# Patient Record
Sex: MALE | Race: WHITE | Employment: FULL TIME | URBAN - METROPOLITAN AREA
[De-identification: names, ages, dates, MRNs, and addresses within clinical notes are randomized per-mention and may not be internally consistent; named-entity substitution may affect disease eponyms.]

---

## 2018-04-06 ENCOUNTER — OFFICE VISIT - RIVER FALLS (OUTPATIENT)
Dept: FAMILY MEDICINE | Facility: CLINIC | Age: 33
End: 2018-04-06

## 2018-04-06 ASSESSMENT — MIFFLIN-ST. JEOR: SCORE: 1794.03

## 2018-11-16 ENCOUNTER — OFFICE VISIT - RIVER FALLS (OUTPATIENT)
Dept: FAMILY MEDICINE | Facility: CLINIC | Age: 33
End: 2018-11-16

## 2018-11-16 ASSESSMENT — MIFFLIN-ST. JEOR: SCORE: 1805.82

## 2018-11-17 LAB
CHOLEST SERPL-MCNC: 213 MG/DL
CHOLEST/HDLC SERPL: 6.9 {RATIO}
CREAT SERPL-MCNC: 0.92 MG/DL (ref 0.6–1.35)
GLUCOSE BLD-MCNC: 68 MG/DL (ref 65–99)
HDLC SERPL-MCNC: 31 MG/DL
LDLC SERPL CALC-MCNC: 148 MG/DL
NONHDLC SERPL-MCNC: 182 MG/DL
TRIGL SERPL-MCNC: 195 MG/DL

## 2019-11-03 ENCOUNTER — HEALTH MAINTENANCE LETTER (OUTPATIENT)
Age: 34
End: 2019-11-03

## 2020-11-16 ENCOUNTER — HEALTH MAINTENANCE LETTER (OUTPATIENT)
Age: 35
End: 2020-11-16

## 2021-09-18 ENCOUNTER — HEALTH MAINTENANCE LETTER (OUTPATIENT)
Age: 36
End: 2021-09-18

## 2022-01-08 ENCOUNTER — HEALTH MAINTENANCE LETTER (OUTPATIENT)
Age: 37
End: 2022-01-08

## 2022-02-11 VITALS
HEIGHT: 67 IN | DIASTOLIC BLOOD PRESSURE: 84 MMHG | HEART RATE: 88 BPM | TEMPERATURE: 97.5 F | BODY MASS INDEX: 30.98 KG/M2 | SYSTOLIC BLOOD PRESSURE: 126 MMHG | WEIGHT: 197.4 LBS

## 2022-02-11 VITALS
HEIGHT: 67 IN | DIASTOLIC BLOOD PRESSURE: 68 MMHG | BODY MASS INDEX: 31.39 KG/M2 | HEART RATE: 76 BPM | WEIGHT: 200 LBS | TEMPERATURE: 97.5 F | SYSTOLIC BLOOD PRESSURE: 124 MMHG
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
## 2022-02-16 NOTE — LETTER
(Inserted Image. Unable to display)   November 19, 2019      PRABHAKAR DE LOS SANTOS  [address]   [place], [place] [id number]        Dear PRABHAKAR,      Thank you for selecting [facility] (previously [facility]) for your healthcare needs.     Our records indicate you are due for the following services:     Annual Physical    To schedule an appointment or if you have further questions, please contact your primary clinic:   [facility]          [phone]   [facility]    [phone]             [facility]         [phone]      Powered by [employer]    Sincerely,    Joshua Muñoz M.D.

## 2022-02-16 NOTE — PROGRESS NOTES
Patient:   PRABHAKAR DE LOS SANTOS            MRN: [id number]            FIN: [id number]               Age:   33 years     Sex:  Male     :  1985   Associated Diagnoses:   Well adult; Encounter for screening for lipoid disorders; Weight gain; H/O cold sores; Dizziness   Author:   Joshua Muñoz MD      Visit Information      Date of Service: 2018 03:50 pm  Performing Location: [facility]  Encounter#: [id number]      Primary Care Provider (PCP):  [facility] -UNKNOWN, PERSONNEL      Referring Provider:  Joshua Muñoz MD    NPI# [id number]      Chief Complaint   2018 4:00 PM CST   Annual Px, Possbile low Blood sugar issures, dizzy, light headed, arms and legs feel heavy happen mulitple times a week . Is fasting for labs     Chief complaint and symptoms noted above confirmed with patient.      Well Adult History   Well Adult History             The patient presents for well adult exam.  The patient's general health status is described as fair.  The patient's diet is described as balanced.  Exercise: routine.  Associated symptoms consist of weight gain.  Additional pertinent history: seat belt use, daily caffeine use, tobacco use 0.5 pack(s)/day and alcohol use socially.        Review of Systems   Constitutional:  Fatigue, Decreased activity.    Eye:  Negative.    Ear/Nose/Mouth/Throat:  Negative.    Respiratory:  Negative.    Cardiovascular:  Negative.    Gastrointestinal:  Negative.    Genitourinary:  Negative.    Hematology/Lymphatics:  Negative.    Endocrine:  Negative.    Immunologic:  Negative.    Musculoskeletal:  Negative.    Integumentary:  cold sores.    Neurologic:  Negative.    Psychiatric:  Negative.       Health Status   Allergies:    Allergic Reactions (Selected)  No Known Medication Allergies   Medications:  (Selected)   Prescriptions  Prescribed  valACYclovir 1 g oral tablet: = 1 tab(s) ( 1 gm ), PO, daily, # 30 tab(s), 1 Refill(s), Type: Maintenance, Pharmacy: [facility]  #[id number], 1 tab(s) Oral daily   Problem list:    All Problems  Obesity / SNOMED CT [id number] / Probable      Histories   Past Medical History:    No active or resolved past medical history items have been selected or recorded.   Family History:    No family history items have been selected or recorded.   Procedure history:    No active procedure history items have been selected or recorded.   Social History:             No active social history items have been recorded.      Physical Examination   Vital Signs   11/16/2018 4:00 PM CST Temperature Tympanic 97.5 DegF  LOW    Peripheral Pulse Rate 76 bpm    Pulse Site Radial artery    HR Method Manual    Systolic Blood Pressure 124 mmHg    Diastolic Blood Pressure 68 mmHg    Mean Arterial Pressure 87 mmHg    BP Site Left arm    BP Method Manual      Measurements from flowsheet : Measurements   11/16/2018 4:00 PM CST Height Measured - Standard 67 in    Weight Measured - Standard 200 lb    BSA 2.07 m2    Body Mass Index 31.32 kg/m2  HI      General:  Alert and oriented, No acute distress.    Eye:  Normal conjunctiva.    HENT:  Normocephalic.    Neck:  Supple, Non-tender.    Respiratory:  Lungs are clear to auscultation, Respirations are non-labored.    Cardiovascular:  Normal rate, Regular rhythm.    Gastrointestinal:  Soft, Non-tender, Non-distended.    Genitourinary:  No costovertebral angle tenderness.    Lymphatics:  No lymphadenopathy neck, axilla, groin.    Musculoskeletal:  Normal range of motion, Normal strength, No tenderness.    Integumentary:  Warm, Dry, Pink.    Neurologic:  Alert, Oriented, Normal sensory.    Psychiatric:  Patient's PHQ9 and CAGE questionnaire reviewed and discussed with patient..       Impression and Plan   Diagnosis     Well adult (QCQ32-QT Z00.00).     Encounter for screening for lipoid disorders (CNC47-NJ Z13.220).     Course:  Progressing as expected.    Diagnosis     Weight gain (WFJ28-YY R63.5).     H/O cold sores (FYU92-GS Z86.19).      Dizziness (OJV59-QX R42).     Orders     Orders (Selected)   Prescriptions  Prescribed  valACYclovir 1 g oral tablet: = 1 tab(s) ( 1 gm ), PO, daily, # 30 tab(s), 1 Refill(s), Type: Maintenance, Pharmacy: [facility] #[id number], 1 tab(s) Oral daily.

## 2022-10-03 ENCOUNTER — OFFICE VISIT (OUTPATIENT)
Dept: FAMILY MEDICINE | Facility: CLINIC | Age: 37
End: 2022-10-03

## 2022-10-03 VITALS
RESPIRATION RATE: 16 BRPM | BODY MASS INDEX: 31.55 KG/M2 | TEMPERATURE: 97.6 F | HEART RATE: 72 BPM | SYSTOLIC BLOOD PRESSURE: 126 MMHG | WEIGHT: 201 LBS | DIASTOLIC BLOOD PRESSURE: 78 MMHG | HEIGHT: 67 IN

## 2022-10-03 DIAGNOSIS — L73.9 HAIR FOLLICLE INFECTION: Primary | ICD-10-CM

## 2022-10-03 PROCEDURE — 99213 OFFICE O/P EST LOW 20 MIN: CPT | Performed by: PHYSICIAN ASSISTANT

## 2022-10-03 RX ORDER — SULFAMETHOXAZOLE/TRIMETHOPRIM 800-160 MG
1 TABLET ORAL 2 TIMES DAILY
Qty: 20 TABLET | Refills: 0 | Status: SHIPPED | OUTPATIENT
Start: 2022-10-03 | End: 2022-10-13

## 2022-10-03 RX ORDER — TRIAMCINOLONE ACETONIDE 1 MG/G
CREAM TOPICAL
COMMUNITY
Start: 2022-06-26 | End: 2022-10-03

## 2022-10-03 RX ORDER — PANTOPRAZOLE SODIUM 20 MG/1
2 TABLET, DELAYED RELEASE ORAL DAILY
COMMUNITY
Start: 2021-10-05

## 2022-10-03 ASSESSMENT — PATIENT HEALTH QUESTIONNAIRE - PHQ9
SUM OF ALL RESPONSES TO PHQ QUESTIONS 1-9: 1
SUM OF ALL RESPONSES TO PHQ QUESTIONS 1-9: 1
10. IF YOU CHECKED OFF ANY PROBLEMS, HOW DIFFICULT HAVE THESE PROBLEMS MADE IT FOR YOU TO DO YOUR WORK, TAKE CARE OF THINGS AT HOME, OR GET ALONG WITH OTHER PEOPLE: NOT DIFFICULT AT ALL

## 2022-10-03 ASSESSMENT — ENCOUNTER SYMPTOMS
CONSTITUTIONAL NEGATIVE: 1
RESPIRATORY NEGATIVE: 1
GASTROINTESTINAL NEGATIVE: 1
ROS SKIN COMMENTS: SKIN LESION

## 2022-10-03 NOTE — PROGRESS NOTES
"  1. Hair follicle infection  Not appropriate for incision and drainage at this time, will treat with oral Bactrim DS for 10 days, use topical triple antibiotic ointment bid for the next 10 days  Expect this to mature and drain on its own, if it becomes worse may need I&D in clinic    - sulfamethoxazole-trimethoprim (BACTRIM DS) 800-160 MG tablet; Take 1 tablet by mouth 2 times daily for 10 days  Dispense: 20 tablet; Refill: 0      Subjective   Dar is a 37 year old accompanied by his self, presenting for the following health issues:  Derm Problem (Pt c/o painful red bump on neckline x 7-10 days)      History of Present Illness       Reason for visit:  Bump on neck at hairline  Symptom onset:  1-2 weeks ago    He eats 0-1 servings of fruits and vegetables daily.He consumes 0 sweetened beverage(s) daily.He exercises with enough effort to increase his heart rate 30 to 60 minutes per day.  He exercises with enough effort to increase his heart rate 7 days per week. He is missing 2 dose(s) of medications per week.    Today's PHQ-9         PHQ-9 Total Score: 1    PHQ-9 Q9 Thoughts of better off dead/self-harm past 2 weeks :   Not at all    How difficult have these problems made it for you to do your work, take care of things at home, or get along with other people: Not difficult at all     Has skin lesion at back of scalp for 3-4 weeks, has become worse over the past week  Has tried to pop it without success        Review of Systems   Constitutional: Negative.    HENT: Negative.    Respiratory: Negative.    Gastrointestinal: Negative.    Skin:        Skin lesion            Objective    /78 (BP Location: Right arm, Patient Position: Sitting, Cuff Size: Adult Large)   Pulse 72   Temp 97.6  F (36.4  C)   Resp 16   Ht 1.702 m (5' 7\")   Wt 91.2 kg (201 lb)   BMI 31.48 kg/m    Body mass index is 31.48 kg/m .  Physical Exam  Vitals reviewed.   Constitutional:       Appearance: Normal appearance.   Skin:     " Comments: At base of scalp just to right of midline, there is a small (6 mm) papule, tender to touch, some induration, no drainage   Neurological:      Mental Status: He is alert.

## 2022-11-20 ENCOUNTER — HEALTH MAINTENANCE LETTER (OUTPATIENT)
Age: 37
End: 2022-11-20

## 2023-04-15 ENCOUNTER — HEALTH MAINTENANCE LETTER (OUTPATIENT)
Age: 38
End: 2023-04-15

## 2024-06-16 ENCOUNTER — HEALTH MAINTENANCE LETTER (OUTPATIENT)
Age: 39
End: 2024-06-16

## 2025-01-10 ENCOUNTER — ANCILLARY PROCEDURE (OUTPATIENT)
Dept: GENERAL RADIOLOGY | Facility: CLINIC | Age: 40
End: 2025-01-10
Payer: COMMERCIAL

## 2025-01-10 DIAGNOSIS — S33.9XXD SPRAIN OF LIGAMENT OF LUMBOSACRAL JOINT, SUBSEQUENT ENCOUNTER: ICD-10-CM

## 2025-01-10 DIAGNOSIS — S83.411D SPRAIN OF MEDIAL COLLATERAL LIGAMENT OF RIGHT KNEE, SUBSEQUENT ENCOUNTER: ICD-10-CM

## 2025-01-10 PROCEDURE — 73562 X-RAY EXAM OF KNEE 3: CPT | Mod: TC | Performed by: RADIOLOGY

## 2025-01-10 PROCEDURE — 72100 X-RAY EXAM L-S SPINE 2/3 VWS: CPT | Mod: TC | Performed by: INTERNAL MEDICINE

## 2025-01-12 ENCOUNTER — APPOINTMENT (OUTPATIENT)
Dept: LAB | Facility: CLINIC | Age: 40
End: 2025-01-12
Payer: COMMERCIAL

## 2025-01-20 ASSESSMENT — ACTIVITIES OF DAILY LIVING (ADL)
RISE FROM A CHAIR: ACTIVITY IS FAIRLY DIFFICULT
SQUAT: ACTIVITY IS VERY DIFFICULT
PAIN: THE SYMPTOM AFFECTS MY ACTIVITY SEVERELY
GIVING WAY, BUCKLING OR SHIFTING OF KNEE: THE SYMPTOM AFFECTS MY ACTIVITY SEVERELY
SWELLING: THE SYMPTOM AFFECTS MY ACTIVITY SLIGHTLY
WEAKNESS: I DO NOT HAVE THE SYMPTOM
AS_A_RESULT_OF_YOUR_KNEE_INJURY,_HOW_WOULD_YOU_RATE_YOUR_CURRENT_LEVEL_OF_DAILY_ACTIVITY?: ABNORMAL
RISE FROM A CHAIR: ACTIVITY IS FAIRLY DIFFICULT
WALK: ACTIVITY IS FAIRLY DIFFICULT
STIFFNESS: THE SYMPTOM AFFECTS MY ACTIVITY SEVERELY
WEAKNESS: I DO NOT HAVE THE SYMPTOM
KNEEL ON THE FRONT OF YOUR KNEE: ACTIVITY IS VERY DIFFICULT
SIT WITH YOUR KNEE BENT: ACTIVITY IS VERY DIFFICULT
RAW_SCORE: 29
HOW_WOULD_YOU_RATE_THE_OVERALL_FUNCTION_OF_YOUR_KNEE_DURING_YOUR_USUAL_DAILY_ACTIVITIES?: ABNORMAL
LIMPING: THE SYMPTOM AFFECTS MY ACTIVITY MODERATELY
STAND: ACTIVITY IS MINIMALLY DIFFICULT
GO DOWN STAIRS: ACTIVITY IS SOMEWHAT DIFFICULT
SQUAT: ACTIVITY IS VERY DIFFICULT
PLEASE_INDICATE_YOR_PRIMARY_REASON_FOR_REFERRAL_TO_THERAPY:: KNEE
GO UP STAIRS: ACTIVITY IS FAIRLY DIFFICULT
HOW_WOULD_YOU_RATE_THE_OVERALL_FUNCTION_OF_YOUR_KNEE_DURING_YOUR_USUAL_DAILY_ACTIVITIES?: ABNORMAL
KNEEL ON THE FRONT OF YOUR KNEE: ACTIVITY IS VERY DIFFICULT
SWELLING: THE SYMPTOM AFFECTS MY ACTIVITY SLIGHTLY
GO UP STAIRS: ACTIVITY IS FAIRLY DIFFICULT
SIT WITH YOUR KNEE BENT: ACTIVITY IS VERY DIFFICULT
KNEE_ACTIVITY_OF_DAILY_LIVING_SUM: 29
KNEE_ACTIVITY_OF_DAILY_LIVING_SCORE: 41.43
LIMPING: THE SYMPTOM AFFECTS MY ACTIVITY MODERATELY
GO DOWN STAIRS: ACTIVITY IS SOMEWHAT DIFFICULT
AS_A_RESULT_OF_YOUR_KNEE_INJURY,_HOW_WOULD_YOU_RATE_YOUR_CURRENT_LEVEL_OF_DAILY_ACTIVITY?: ABNORMAL
PAIN: THE SYMPTOM AFFECTS MY ACTIVITY SEVERELY
WALK: ACTIVITY IS FAIRLY DIFFICULT
STAND: ACTIVITY IS MINIMALLY DIFFICULT
STIFFNESS: THE SYMPTOM AFFECTS MY ACTIVITY SEVERELY
GIVING WAY, BUCKLING OR SHIFTING OF KNEE: THE SYMPTOM AFFECTS MY ACTIVITY SEVERELY

## 2025-01-22 ENCOUNTER — THERAPY VISIT (OUTPATIENT)
Dept: PHYSICAL THERAPY | Facility: CLINIC | Age: 40
End: 2025-01-22
Payer: COMMERCIAL

## 2025-01-22 DIAGNOSIS — G89.29 CHRONIC MIDLINE LOW BACK PAIN: Primary | ICD-10-CM

## 2025-01-22 DIAGNOSIS — G89.29 CHRONIC PAIN OF RIGHT KNEE: ICD-10-CM

## 2025-01-22 DIAGNOSIS — S83.411D SPRAIN OF MEDIAL COLLATERAL LIGAMENT OF RIGHT KNEE, SUBSEQUENT ENCOUNTER: ICD-10-CM

## 2025-01-22 DIAGNOSIS — M54.50 CHRONIC MIDLINE LOW BACK PAIN: Primary | ICD-10-CM

## 2025-01-22 DIAGNOSIS — M25.561 CHRONIC PAIN OF RIGHT KNEE: ICD-10-CM

## 2025-01-22 DIAGNOSIS — S33.9XXD SPRAIN OF LIGAMENT OF LUMBOSACRAL JOINT, SUBSEQUENT ENCOUNTER: ICD-10-CM

## 2025-01-22 PROCEDURE — 97163 PT EVAL HIGH COMPLEX 45 MIN: CPT | Mod: GP | Performed by: PHYSICAL THERAPIST

## 2025-01-22 PROCEDURE — 97110 THERAPEUTIC EXERCISES: CPT | Mod: GP | Performed by: PHYSICAL THERAPIST

## 2025-01-22 NOTE — PROGRESS NOTES
PHYSICAL THERAPY EVALUATION  Type of Visit: Evaluation        Fall Risk Screen:  Fall screen completed by: PT  Have you fallen 2 or more times in the past year?: No  Have you fallen and had an injury in the past year?: No  Is patient a fall risk?: No    Subjective         Presenting condition or subjective complaint: Knee pain and lower back pain  Date of onset: 06/01/24    Relevant medical history:     Dates & types of surgery: Shoulder surgery  Pt reports that his knee started hurting last June and the pain has increased over time.  He reports that some days he gets up in the morning and his knee feels like it will buckle on him.  It hurts to bend it if it's straight.  He reports constant popping.  The pain used to be medially, but now he is also feeling the pain at anterior knee and lateral knee.  He is concerned that the brace he is wearing may be causing the anterior and lateral knee pain due to the pressure of the brace.    He has had chronic LBP - over a year.  Sleeping is very difficult - he feels like it has been getting worse.    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  Hobbies/Interests:      Patient goals for therapy: Walk normal and sleep    Pain assessment:      Objective   KNEE EVALUATION  PAIN: Pain Level at Rest: 0/10  Pain Level with Use: 9/10  Pain Location: medial R knee - but has increased at anterior knee and lateral knee  Pain Quality:  Sharp  Pain Frequency: intermittent  Pain is Worst: with activity  Pain is Exacerbated By: crossing legs, keeping it straight for too long, walking too long, getting up from floor, unable to lie on side due to pressure at knee (wakes at night due to this), stairs, squatting  Pain is Relieved By: unsure if brace is helping, muscle relaxers - unable to take them more than 3 days/week.  Pain Progression: Worsened  INTEGUMENTARY (edema, incisions):   POSTURE:   GAIT:  Weightbearing Status:   Assistive Device(s):   Gait Deviations:   BALANCE/PROPRIOCEPTION:   WEIGHTBEARING ALIGNMENT: B pes planus, increased swelling at R knee superior to patella  NON-WEIGHTBEARING ALIGNMENT:   ROM:   (Degrees) Left AROM Left PROM  Right AROM Right PROM   Knee Flexion 140  70 (throbbing at medial knee)    Knee Extension 20 degrees  10 degrees hyperextension (significant pain at medial knee)    Pain:   End feel:     STRENGTH:   Pain: - none + mild ++ moderate +++ severe  Strength Scale: 0-5/5 Left Right   Knee Flexion 5 Significant pain even with slight resistance, limited by pain   Knee Extension 5 Significant pain with straightening and unable to take resistance   Quad Set 5 4 (significant medial knee pain)     FLEXIBILITY:   SPECIAL TESTS: unable to assess ligaments secondary to significant pain.    FUNCTIONAL TESTS:   PALPATION:  Significant TTP at medial knee joint  JOINT MOBILITY:  sup/inf PF mobility WNL, lateral/medial PF mobility limited with pain - unable to fully assess secondary to pain.    LUMBAR SPINE EVALUATION  PAIN:   INTEGUMENTARY (edema, incisions):   POSTURE:   GAIT:   Weightbearing Status:   Assistive Device(s):   Gait Deviations:   BALANCE/PROPRIOCEPTION:   WEIGHTBEARING ALIGNMENT:   NON-WEIGHTBEARING ALIGNMENT:    ROM:   (Degrees) Left AROM Left PROM  Right AROM Right PROM   Hip Flexion       Hip Extension       Hip Abduction       Hip Adduction       Hip Internal Rotation       Hip External Rotation       Knee  Flexion       Knee Extension       Lumbar Side glide Min loss (+) Min loss (+)   Lumbar Flexion Min loss (vears to L due to off-weighting R knee) - increased stiffness upon returning   Lumbar Extension Min loss (++ at low bac)   Pain:   End feel:   PELVIC/SI SCREEN:   STRENGTH:   Work mechanical stresses:  sitting  Leisure mechanical stresses:   Functional disability score (MICHAEL/STarT Back):    VAS score (0-10): 5/10 in morning    ADDITIONAL HISTORY:  Present symptoms: midline lower lumbar  Pain quality: dull pain, except more intense in the morning  Paresthesia (yes/no):  no  Symptoms (improving/unchanging/worsening):  worsening.   Symptoms commenced as a result of: insiduou   Condition occurred in the following environment:   n/a     Symptoms at onset (back/thigh/leg): midline lower back  Constant symptoms (back/thigh/leg):   Intermittent symptoms (back/thigh/leg): midline lower back    Symptoms are made worse with the following: getting up out of bed, after long periods of walking (20-25 mn), prolonged sitting (every 15-20 mn).  Symptoms are made better with the following: movements  Disturbed sleep (yes/no):  Y Sleeping postures (prone/sup/side R/L): R/L side    Previous episodes (0/1-5/6-10/11+): 0 Year of first episode:     Previous history:   Previous treatments:     Specific Questions:  Cough/Sneeze/Strain (pos/neg): neg  Bowel/Bladder (normal/abnormal): normal  Gait (normal/abnormal): normal in clinic   Medications (nil/NSAIDS/analg/steroids/anticoag/other):    Current Outpatient Medications   Medication Sig Dispense Refill    alum hydroxide-mag carbonate (GAVISCON)  MG/15ML SUSP suspension Take 30 mLs by mouth 2 times daily as needed for heartburn.      calcium carbonate (TUMS) 500 MG chewable tablet Take 1 chew tab by mouth 2 times daily.      cyclobenzaprine (FLEXERIL) 10 MG tablet Take 1 tablet (10 mg) by mouth nightly as needed for muscle spasms. 20 tablet 0    ibuprofen (ADVIL/MOTRIN) 200 MG  tablet Take 1,400 mg by mouth daily.      omeprazole (PRILOSEC) 20 MG DR capsule Take 20 mg by mouth 2 times daily.       No current facility-administered medications for this visit.       Medical allergies:    General health (excellent/good/fair/poor):  fair  Pertinent medical history:    Past Medical History:   Diagnosis Date    Asthma, intermittent     exercise induced    SPRAIN SHOULDER/ARM NEC 3/24/2006       Imaging (None/Xray/MRI/Other):  x-ray  Recent or major surgery (yes/no):  no  Night pain (yes/no): no  Accidents (yes/no): no  Unexplained weight loss (yes/no): no  Barriers at home: no  Other red flags: no    Postural Observation:   Sitting:  slouched   Change of posture:   Standing: Lordotic  Lateral Shift: Nil.  Other Observations:     Neurological: not tested since no radicular sx  Motor Deficit:     Reflexes:    Sensory Deficit:     Neurodynamic tests:      Test Movements: Not assessed today secondary to significant right knee pain - will assess at later visit  During: produces, abolishes, increases, decreases, no effect, centralizing, peripheralizing   After: better, worse, no better, no worse, no effect, centralized, peripheralized    Symptomatic response Mechanical response    During testing After testing Effect - increased ROM, decreased ROM, or key functional test No Effect   Pretest symptoms standing:      Rep FIS       Rep EIS         Pretest symptoms lying:     During testing After testing Effect - increased ROM, decreased ROM, or key functional test No Effect   Rep KANG       Rep EIL         If required, pretest symptoms:    During testing After testing Effect - increased ROM, decreased ROM, or key functional test No Effect   Rep SGIS - R       Rep SGIS - L         Static Tests:      Provisional Classification:     Potential Drivers of Pain and/or Disability:     Principle of Management:  Education:     Equipment provided:    Mechanical therapy (Y/N):     Extension principle:    Lateral  Principle:    Flexion principle:    Other:    MYOTOMES:   DTR S:   CORD SIGNS:   DERMATOMES:   NEURAL TENSION:   FLEXIBILITY:   LUMBAR/HIP Special Tests:    PELVIS/SI SPECIAL TESTS:   FUNCTIONAL TESTS:   PALPATION:   SPINAL SEGMENTAL CONCLUSIONS:       Assessment & Plan   CLINICAL IMPRESSIONS  Medical Diagnosis: Sprain of ligament of lumbosacral joint, subsequent encounter  Sprain of medial collateral ligament of right knee, subsequent encounter    Treatment Diagnosis: chronic midline low back pain, chronic right knee pain   Impression/Assessment: Patient is a 39 year old male with R knee and midline LBP complaints.  The following significant findings have been identified: Pain, Decreased ROM/flexibility, Decreased strength, Edema, Impaired gait, Impaired muscle performance, Decreased activity tolerance, Impaired posture, and Instability. These impairments interfere with their ability to perform self care tasks, work tasks, recreational activities, household chores, driving , household mobility, community mobility, and sleep  as compared to previous level of function.     Clinical Decision Making (Complexity):  Clinical Presentation: Unstable/Unpredictable   Clinical Presentation Rationale: based on medical and personal factors listed in PT evaluation  Clinical Decision Making (Complexity): High complexity    PLAN OF CARE  Treatment Interventions:  Modalities: Cryotherapy  Interventions: Manual Therapy, Neuromuscular Re-education, Therapeutic Activity, Therapeutic Exercise    Long Term Goals     PT Goal 1  Goal Identifier: Getting out of bed in the morning  Goal Description: Pt will be able to get up out of bed in the morning with 0-1/10 concordant LBP.  Rationale: to maximize safety and independence within the home;to maximize safety and independence with performance of ADLs and functional tasks  Target Date: 03/19/25  PT Goal 2  Goal Identifier: going from sit to stand  Goal Description: Pt will be able to go from  sit to stand with 0-1/10 concordant R knee pain.  Rationale: to maximize safety and independence within the home  Target Date: 03/05/25  PT Goal 3  Goal Identifier: Stairclimbing  Goal Description: Pt will be able to go up/down at flight of stairs reciprocally with 0-2/10 concordant R knee pain.  Rationale: to maximize safety and independence within the community;to maximize safety and independence within the home  Target Date: 03/19/25      Frequency of Treatment: 1x/week  Duration of Treatment: 8-10 weeks    Recommended Referrals to Other Professionals:   Education Assessment:   Learner/Method: No Barriers to Learning    Risks and benefits of evaluation/treatment have been explained.   Patient/Family/caregiver agrees with Plan of Care.     Evaluation Time:     PT Eval, High Complexity Minutes (00940): 15       Signing Clinician: Jeanine Chester PT             No

## 2025-06-21 ENCOUNTER — HEALTH MAINTENANCE LETTER (OUTPATIENT)
Age: 40
End: 2025-06-21

## 2025-06-30 ENCOUNTER — OFFICE VISIT (OUTPATIENT)
Dept: FAMILY MEDICINE | Facility: CLINIC | Age: 40
End: 2025-06-30
Payer: COMMERCIAL

## 2025-06-30 VITALS
DIASTOLIC BLOOD PRESSURE: 80 MMHG | BODY MASS INDEX: 32.65 KG/M2 | HEART RATE: 60 BPM | OXYGEN SATURATION: 97 % | RESPIRATION RATE: 20 BRPM | TEMPERATURE: 97.5 F | SYSTOLIC BLOOD PRESSURE: 130 MMHG | WEIGHT: 208 LBS | HEIGHT: 67 IN

## 2025-06-30 DIAGNOSIS — L30.9 DERMATITIS: ICD-10-CM

## 2025-06-30 DIAGNOSIS — K61.2 ABSCESS OF ANAL OR RECTAL REGION: Primary | ICD-10-CM

## 2025-06-30 PROCEDURE — 3079F DIAST BP 80-89 MM HG: CPT | Performed by: NURSE PRACTITIONER

## 2025-06-30 PROCEDURE — 3075F SYST BP GE 130 - 139MM HG: CPT | Performed by: NURSE PRACTITIONER

## 2025-06-30 PROCEDURE — 99214 OFFICE O/P EST MOD 30 MIN: CPT | Performed by: NURSE PRACTITIONER

## 2025-06-30 RX ORDER — BETAMETHASONE DIPROPIONATE 0.5 MG/G
CREAM TOPICAL 2 TIMES DAILY
Qty: 15 G | Refills: 0 | Status: SHIPPED | OUTPATIENT
Start: 2025-06-30

## 2025-06-30 NOTE — PROGRESS NOTES
"  Assessment & Plan     Abscess of anal or rectal region  Healing as expected.  Packing accidentally removed by patient 2 days ago.  Has had serosanguineous drainage.  Pain is improving.  Only pain with sitting in certain positions.  No pressure pain.  With improvement, will hold off on packing.  He should complete Duricef antibiotic.  Recommend he follow-up if he has worsening pain, increased drainage, redness or swelling of abscess or rectum, fever or chills.  Recommend he continue with soaking in the bathtub or sitzs bath.  He verbalized understanding.    Dermatitis  2 month history of rash of distal fingertips 2nd through 4th digits bilaterally, systems wax and wane with typical progression of scale, then firm bump that eventually bleeds and is painful, then resolves.  Triamcinolone cream prescribed through [facility] and not effective.  Recommend increasing to more potent steroid with betamethasone as below twice daily for 2 weeks.  Follow-up if symptoms not improving or return after completion of steroid treatment.  Caution to avoid touching other areas within skin such as the eyelids.  No pitting of the nails or thickened discolored nails.  Seems to be more of an issue with the skin, possible dermatitis from irritant but he denies any new exposures.  - betamethasone dipropionate (DIPROSONE) 0.05 % external cream; Apply topically 2 times daily. X 2 weeks          BMI  Estimated body mass index is 32.58 kg/m  as calculated from the following:    Height as of this encounter: 1.702 m (5' 7\").    Weight as of this encounter: 94.3 kg (208 lb).             Susie Dodge is a 39 year old, presenting for the following health issues:  Follow Up (Pt here for Follow-up on perianal abscess from 6/27.  Pt statesthe packing fell out sat.) and Derm Problem (Pt c/o \"peeling skin\" at finger tips.  Pt state the triamcinolone isnt helping)      6/30/2025     2:33 PM   Additional Questions   Roomed by Hadley CHAPA " "    Dar presents today for follow up for abscess near the rectum.  He excellently pulled out packing on Saturday, 2 days ago.  He has had some yellowish discharge and blood with wiping.  Change in gauze about twice daily due to wetness.  Continues on Duracef. [place] chilled two days ago despite the heat, but no fever and chills the last two days.  Over pain improving.    Also reports rash of fingertips X couple months  Finger tips 2nd through 4th will scale, then develop a hard bump, skin sheds then bleeds, then resolves and seems to move onto the next finger  No exposures, no new hand or dish detergent   Works on computer  No pain until breaks open and bleeds  TAC not helpful  No nail changes      History of Present Illness       Reason for visit:  Wound Check    He eats 0-1 servings of fruits and vegetables daily.He consumes 0 sweetened beverage(s) daily.He exercises with enough effort to increase his heart rate 60 or more minutes per day.  He exercises with enough effort to increase his heart rate 6 days per week.   He is taking medications regularly.                  Review of Systems  Constitutional, neuro, pulmonary, cardiac, gastrointestinal, genitourinary, musculoskeletal, integument systems are negative, except as otherwise noted.      Objective    BP (!) 144/97 (BP Location: Right arm, Patient Position: Sitting, Cuff Size: Adult Large)   Pulse 60   Temp 97.5  F (36.4  C) (Tympanic)   Resp 20   Ht 1.702 m (5' 7\")   Wt 94.3 kg (208 lb)   SpO2 97%   BMI 32.58 kg/m    Body mass index is 32.58 kg/m .  Physical Exam  Constitutional:       General: He is not in acute distress.     Appearance: Normal appearance. He is not ill-appearing or toxic-appearing.   HENT:      Head: Normocephalic and atraumatic.   Skin:            Comments: Mild yellow discharge, decreased erythema surrounding wound near rectum on right, no area of induration or fluctuant mass.    Distal right 4th digit with thickened skin and " scale, no bleeding today.  Distal left 3rd digit with scale.  No thickened nails or pitting of the nails.   Neurological:      Mental Status: He is alert.                          Signed Electronically by: SYDNEY Delcid CNP

## 2025-09-03 ENCOUNTER — OFFICE VISIT (OUTPATIENT)
Dept: FAMILY MEDICINE | Facility: CLINIC | Age: 40
End: 2025-09-03
Payer: COMMERCIAL

## 2025-09-03 VITALS
HEART RATE: 77 BPM | WEIGHT: 201.6 LBS | HEIGHT: 67 IN | SYSTOLIC BLOOD PRESSURE: 125 MMHG | TEMPERATURE: 98 F | BODY MASS INDEX: 31.64 KG/M2 | DIASTOLIC BLOOD PRESSURE: 86 MMHG | OXYGEN SATURATION: 98 % | RESPIRATION RATE: 16 BRPM

## 2025-09-03 DIAGNOSIS — E66.09 CLASS 1 OBESITY DUE TO EXCESS CALORIES WITHOUT SERIOUS COMORBIDITY WITH BODY MASS INDEX (BMI) OF 31.0 TO 31.9 IN ADULT: ICD-10-CM

## 2025-09-03 DIAGNOSIS — E66.811 CLASS 1 OBESITY DUE TO EXCESS CALORIES WITHOUT SERIOUS COMORBIDITY WITH BODY MASS INDEX (BMI) OF 31.0 TO 31.9 IN ADULT: ICD-10-CM

## 2025-09-03 DIAGNOSIS — B35.2 TINEA MANUUM: Primary | ICD-10-CM

## 2025-09-03 PROCEDURE — 99213 OFFICE O/P EST LOW 20 MIN: CPT

## 2025-09-03 PROCEDURE — 3079F DIAST BP 80-89 MM HG: CPT

## 2025-09-03 PROCEDURE — 3074F SYST BP LT 130 MM HG: CPT

## 2025-09-03 RX ORDER — NALTREXONE HYDROCHLORIDE AND BUPROPION HYDROCHLORIDE 8; 90 MG/1; MG/1
1 TABLET, EXTENDED RELEASE ORAL DAILY
Qty: 30 TABLET | Refills: 1 | Status: SHIPPED | OUTPATIENT
Start: 2025-09-03

## 2025-09-03 RX ORDER — CLOTRIMAZOLE AND BETAMETHASONE DIPROPIONATE 10; .64 MG/G; MG/G
CREAM TOPICAL 2 TIMES DAILY
Qty: 45 G | Refills: 1 | Status: SHIPPED | OUTPATIENT
Start: 2025-09-03 | End: 2025-09-24

## 2025-09-04 ENCOUNTER — PATIENT OUTREACH (OUTPATIENT)
Dept: CARE COORDINATION | Facility: CLINIC | Age: 40
End: 2025-09-04
Payer: COMMERCIAL